# Patient Record
Sex: MALE | Race: WHITE | URBAN - METROPOLITAN AREA
[De-identification: names, ages, dates, MRNs, and addresses within clinical notes are randomized per-mention and may not be internally consistent; named-entity substitution may affect disease eponyms.]

---

## 2021-05-25 ENCOUNTER — RECORDS - HEALTHEAST (OUTPATIENT)
Dept: ADMINISTRATIVE | Facility: CLINIC | Age: 31
End: 2021-05-25

## 2021-05-26 ENCOUNTER — RECORDS - HEALTHEAST (OUTPATIENT)
Dept: ADMINISTRATIVE | Facility: CLINIC | Age: 31
End: 2021-05-26

## 2021-05-31 ENCOUNTER — RECORDS - HEALTHEAST (OUTPATIENT)
Dept: ADMINISTRATIVE | Facility: CLINIC | Age: 31
End: 2021-05-31

## 2022-09-28 ENCOUNTER — HOSPITAL ENCOUNTER (EMERGENCY)
Age: 32
Discharge: ANOTHER HOSPITAL | End: 2022-09-28
Attending: EMERGENCY MEDICINE
Payer: COMMERCIAL

## 2022-09-28 ENCOUNTER — APPOINTMENT (OUTPATIENT)
Dept: GENERAL RADIOLOGY | Age: 32
DRG: 638 | End: 2022-09-28
Attending: FAMILY MEDICINE
Payer: COMMERCIAL

## 2022-09-28 ENCOUNTER — APPOINTMENT (OUTPATIENT)
Dept: ULTRASOUND IMAGING | Age: 32
DRG: 638 | End: 2022-09-28
Attending: FAMILY MEDICINE
Payer: COMMERCIAL

## 2022-09-28 ENCOUNTER — HOSPITAL ENCOUNTER (INPATIENT)
Age: 32
LOS: 1 days | Discharge: HOME | DRG: 638 | End: 2022-09-29
Attending: FAMILY MEDICINE | Admitting: FAMILY MEDICINE
Payer: COMMERCIAL

## 2022-09-28 VITALS
OXYGEN SATURATION: 95 % | HEART RATE: 103 BPM | DIASTOLIC BLOOD PRESSURE: 86 MMHG | SYSTOLIC BLOOD PRESSURE: 120 MMHG | WEIGHT: 170 LBS | BODY MASS INDEX: 24.34 KG/M2 | RESPIRATION RATE: 20 BRPM | TEMPERATURE: 97.5 F | HEIGHT: 70 IN

## 2022-09-28 DIAGNOSIS — E08.10 DIABETIC KETOACIDOSIS WITHOUT COMA ASSOCIATED WITH DIABETES MELLITUS DUE TO UNDERLYING CONDITION (HCC): Primary | ICD-10-CM

## 2022-09-28 DIAGNOSIS — R07.9 CHEST PAIN, UNSPECIFIED TYPE: Primary | ICD-10-CM

## 2022-09-28 PROBLEM — E11.69 HYPERLIPIDEMIA ASSOCIATED WITH TYPE 2 DIABETES MELLITUS (HCC): Status: ACTIVE | Noted: 2021-06-16

## 2022-09-28 PROBLEM — E10.65 TYPE 1 DIABETES MELLITUS WITH HYPERGLYCEMIA (HCC): Status: ACTIVE | Noted: 2021-06-16

## 2022-09-28 PROBLEM — M72.0 DUPUYTREN'S CONTRACTURE OF BOTH HANDS: Status: ACTIVE | Noted: 2021-06-11

## 2022-09-28 PROBLEM — E11.59 HYPERTENSION ASSOCIATED WITH DIABETES (HCC): Status: ACTIVE | Noted: 2021-06-16

## 2022-09-28 PROBLEM — R11.2 NAUSEA & VOMITING: Status: ACTIVE | Noted: 2022-09-28

## 2022-09-28 PROBLEM — I15.2 HYPERTENSION ASSOCIATED WITH DIABETES (HCC): Status: ACTIVE | Noted: 2021-06-16

## 2022-09-28 PROBLEM — E78.5 HYPERLIPIDEMIA ASSOCIATED WITH TYPE 2 DIABETES MELLITUS (HCC): Status: ACTIVE | Noted: 2021-06-16

## 2022-09-28 PROBLEM — N52.9 VASCULOGENIC ERECTILE DYSFUNCTION: Status: ACTIVE | Noted: 2021-08-16

## 2022-09-28 LAB
ALBUMIN SERPL-MCNC: 4.2 G/DL (ref 3.5–5)
ALBUMIN/GLOB SERPL: 1.4 {RATIO}
ALP SERPL-CCNC: 95 U/L (ref 45–117)
ALT SERPL-CCNC: 16 U/L (ref 13–61)
ANION GAP SERPL CALC-SCNC: 10 MMOL/L (ref 4–13)
ANION GAP SERPL CALC-SCNC: 14 MMOL/L (ref 4–13)
ANION GAP SERPL CALC-SCNC: 22 MMOL/L (ref 7–16)
ANION GAP SERPL CALC-SCNC: 4 MMOL/L (ref 4–13)
AST SERPL-CCNC: 13 U/L (ref 15–37)
BASOPHILS # BLD: 0 K/UL (ref 0–0.2)
BASOPHILS NFR BLD: 0 % (ref 0–2)
BILIRUB SERPL-MCNC: 0.9 MG/DL (ref 0.2–1.1)
BUN SERPL-MCNC: 12 MG/DL (ref 6–23)
BUN SERPL-MCNC: 14 MG/DL (ref 6–23)
BUN SERPL-MCNC: 14 MG/DL (ref 6–23)
BUN SERPL-MCNC: 15 MG/DL (ref 7–18)
CALCIUM SERPL-MCNC: 10.2 MG/DL (ref 8.3–10.4)
CALCIUM SERPL-MCNC: 8.5 MG/DL (ref 8.3–10.4)
CALCIUM SERPL-MCNC: 8.6 MG/DL (ref 8.3–10.4)
CALCIUM SERPL-MCNC: 9.2 MG/DL (ref 8.3–10.4)
CHLORIDE SERPL-SCNC: 102 MMOL/L (ref 101–110)
CHLORIDE SERPL-SCNC: 107 MMOL/L (ref 101–110)
CHLORIDE SERPL-SCNC: 87 MMOL/L (ref 98–107)
CHLORIDE SERPL-SCNC: 99 MMOL/L (ref 101–110)
CO2 SERPL-SCNC: 16 MMOL/L (ref 21–32)
CO2 SERPL-SCNC: 16 MMOL/L (ref 21–32)
CO2 SERPL-SCNC: 21 MMOL/L (ref 21–32)
CO2 SERPL-SCNC: 23 MMOL/L (ref 21–32)
CREAT SERPL-MCNC: 0.95 MG/DL (ref 0.8–1.5)
CREAT SERPL-MCNC: 0.97 MG/DL (ref 0.8–1.5)
CREAT SERPL-MCNC: 1.01 MG/DL (ref 0.8–1.5)
CREAT SERPL-MCNC: 1.1 MG/DL (ref 0.8–1.5)
DIFFERENTIAL METHOD BLD: ABNORMAL
EOSINOPHIL # BLD: 0 K/UL (ref 0–0.8)
EOSINOPHIL NFR BLD: 0 % (ref 0.5–7.8)
ERYTHROCYTE [DISTWIDTH] IN BLOOD BY AUTOMATED COUNT: 12.1 % (ref 11.9–14.6)
EST. AVERAGE GLUCOSE BLD GHB EST-MCNC: 192 MG/DL
GLOBULIN SER CALC-MCNC: 3.1 G/DL (ref 2.3–3.5)
GLUCOSE BLD STRIP.AUTO-MCNC: 119 MG/DL (ref 65–100)
GLUCOSE BLD STRIP.AUTO-MCNC: 120 MG/DL (ref 65–100)
GLUCOSE BLD STRIP.AUTO-MCNC: 133 MG/DL (ref 65–100)
GLUCOSE BLD STRIP.AUTO-MCNC: 149 MG/DL (ref 65–100)
GLUCOSE BLD STRIP.AUTO-MCNC: 160 MG/DL (ref 65–100)
GLUCOSE BLD STRIP.AUTO-MCNC: 160 MG/DL (ref 65–100)
GLUCOSE BLD STRIP.AUTO-MCNC: 193 MG/DL (ref 65–100)
GLUCOSE BLD STRIP.AUTO-MCNC: 212 MG/DL (ref 65–100)
GLUCOSE BLD STRIP.AUTO-MCNC: 242 MG/DL (ref 65–100)
GLUCOSE BLD STRIP.AUTO-MCNC: 272 MG/DL (ref 65–100)
GLUCOSE BLD STRIP.AUTO-MCNC: 292 MG/DL (ref 65–100)
GLUCOSE BLD STRIP.AUTO-MCNC: 299 MG/DL (ref 65–100)
GLUCOSE BLD STRIP.AUTO-MCNC: 310 MG/DL (ref 65–100)
GLUCOSE BLD STRIP.AUTO-MCNC: 354 MG/DL (ref 65–100)
GLUCOSE BLD-MCNC: 292 MG/DL
GLUCOSE BLD-MCNC: 354 MG/DL
GLUCOSE SERPL-MCNC: 150 MG/DL (ref 65–100)
GLUCOSE SERPL-MCNC: 179 MG/DL (ref 65–100)
GLUCOSE SERPL-MCNC: 325 MG/DL (ref 65–100)
GLUCOSE SERPL-MCNC: 331 MG/DL (ref 65–100)
HBA1C MFR BLD: 8.3 % (ref 4.8–5.6)
HCT VFR BLD AUTO: 41.1 % (ref 41.1–50.3)
HGB BLD-MCNC: 14.6 G/DL (ref 13.6–17.2)
IMM GRANULOCYTES # BLD AUTO: 0.2 K/UL (ref 0–0.5)
IMM GRANULOCYTES NFR BLD AUTO: 1 % (ref 0–5)
LIPASE SERPL-CCNC: 26 U/L (ref 13–60)
LYMPHOCYTES # BLD: 2.1 K/UL (ref 0.5–4.6)
LYMPHOCYTES NFR BLD: 9 % (ref 13–44)
MAGNESIUM SERPL-MCNC: 1.6 MG/DL (ref 1.2–2.6)
MAGNESIUM SERPL-MCNC: 1.6 MG/DL (ref 1.8–2.4)
MAGNESIUM SERPL-MCNC: 1.9 MG/DL (ref 1.8–2.4)
MCH RBC QN AUTO: 29.4 PG (ref 26.1–32.9)
MCHC RBC AUTO-ENTMCNC: 35.5 G/DL (ref 31.4–35)
MCV RBC AUTO: 82.9 FL (ref 79.6–97.8)
MONOCYTES # BLD: 1.3 K/UL (ref 0.1–1.3)
MONOCYTES NFR BLD: 6 % (ref 4–12)
NEUTS SEG # BLD: 19 K/UL (ref 1.7–8.2)
NEUTS SEG NFR BLD: 84 % (ref 43–78)
NRBC # BLD: 0 K/UL (ref 0–0.2)
PHOSPHATE SERPL-MCNC: 1.3 MG/DL (ref 2.5–4.5)
PHOSPHATE SERPL-MCNC: 1.5 MG/DL (ref 2.5–4.5)
PLATELET # BLD AUTO: 632 K/UL (ref 150–450)
PMV BLD AUTO: 9.1 FL (ref 9.4–12.3)
POTASSIUM SERPL-SCNC: 3.8 MMOL/L (ref 3.5–5.1)
POTASSIUM SERPL-SCNC: 4 MMOL/L (ref 3.5–5.1)
POTASSIUM SERPL-SCNC: 4.2 MMOL/L (ref 3.5–5.1)
POTASSIUM SERPL-SCNC: 4.2 MMOL/L (ref 3.5–5.1)
PROT SERPL-MCNC: 7.3 G/DL (ref 6.4–8.2)
RBC # BLD AUTO: 4.96 M/UL (ref 4.23–5.6)
SERVICE CMNT-IMP: ABNORMAL
SODIUM SERPL-SCNC: 125 MMOL/L (ref 138–145)
SODIUM SERPL-SCNC: 130 MMOL/L (ref 136–145)
SODIUM SERPL-SCNC: 132 MMOL/L (ref 136–145)
SODIUM SERPL-SCNC: 134 MMOL/L (ref 136–145)
TROPONIN I SERPL HS-MCNC: 4.6 PG/ML (ref 0–14)
TROPONIN I SERPL HS-MCNC: 4.7 PG/ML (ref 0–14)
WBC # BLD AUTO: 22.5 K/UL (ref 4.3–11.1)

## 2022-09-28 PROCEDURE — 6360000002 HC RX W HCPCS: Performed by: EMERGENCY MEDICINE

## 2022-09-28 PROCEDURE — 2500000003 HC RX 250 WO HCPCS: Performed by: FAMILY MEDICINE

## 2022-09-28 PROCEDURE — 2000000000 HC ICU R&B

## 2022-09-28 PROCEDURE — 2580000003 HC RX 258: Performed by: EMERGENCY MEDICINE

## 2022-09-28 PROCEDURE — 83735 ASSAY OF MAGNESIUM: CPT

## 2022-09-28 PROCEDURE — 71045 X-RAY EXAM CHEST 1 VIEW: CPT

## 2022-09-28 PROCEDURE — 6370000000 HC RX 637 (ALT 250 FOR IP): Performed by: HOSPITALIST

## 2022-09-28 PROCEDURE — 6360000002 HC RX W HCPCS: Performed by: FAMILY MEDICINE

## 2022-09-28 PROCEDURE — 76706 US ABDL AORTA SCREEN AAA: CPT

## 2022-09-28 PROCEDURE — 2700000000 HC OXYGEN THERAPY PER DAY

## 2022-09-28 PROCEDURE — 83690 ASSAY OF LIPASE: CPT

## 2022-09-28 PROCEDURE — 96365 THER/PROPH/DIAG IV INF INIT: CPT

## 2022-09-28 PROCEDURE — 36415 COLL VENOUS BLD VENIPUNCTURE: CPT

## 2022-09-28 PROCEDURE — 84100 ASSAY OF PHOSPHORUS: CPT

## 2022-09-28 PROCEDURE — 6370000000 HC RX 637 (ALT 250 FOR IP): Performed by: FAMILY MEDICINE

## 2022-09-28 PROCEDURE — 83036 HEMOGLOBIN GLYCOSYLATED A1C: CPT

## 2022-09-28 PROCEDURE — 2580000003 HC RX 258: Performed by: HOSPITALIST

## 2022-09-28 PROCEDURE — 80053 COMPREHEN METABOLIC PANEL: CPT

## 2022-09-28 PROCEDURE — 6370000000 HC RX 637 (ALT 250 FOR IP): Performed by: EMERGENCY MEDICINE

## 2022-09-28 PROCEDURE — 84484 ASSAY OF TROPONIN QUANT: CPT

## 2022-09-28 PROCEDURE — 82962 GLUCOSE BLOOD TEST: CPT

## 2022-09-28 PROCEDURE — 2580000003 HC RX 258: Performed by: FAMILY MEDICINE

## 2022-09-28 PROCEDURE — 99285 EMERGENCY DEPT VISIT HI MDM: CPT

## 2022-09-28 PROCEDURE — 85025 COMPLETE CBC W/AUTO DIFF WBC: CPT

## 2022-09-28 PROCEDURE — 96375 TX/PRO/DX INJ NEW DRUG ADDON: CPT

## 2022-09-28 PROCEDURE — 80048 BASIC METABOLIC PNL TOTAL CA: CPT

## 2022-09-28 RX ORDER — PROCHLORPERAZINE EDISYLATE 5 MG/ML
10 INJECTION INTRAMUSCULAR; INTRAVENOUS
Status: COMPLETED | OUTPATIENT
Start: 2022-09-28 | End: 2022-09-28

## 2022-09-28 RX ORDER — HYDROMORPHONE HYDROCHLORIDE 1 MG/ML
1 INJECTION, SOLUTION INTRAMUSCULAR; INTRAVENOUS; SUBCUTANEOUS EVERY 4 HOURS PRN
Status: DISCONTINUED | OUTPATIENT
Start: 2022-09-28 | End: 2022-09-29 | Stop reason: HOSPADM

## 2022-09-28 RX ORDER — MAGNESIUM SULFATE 1 G/100ML
1000 INJECTION INTRAVENOUS PRN
Status: DISCONTINUED | OUTPATIENT
Start: 2022-09-28 | End: 2022-09-29 | Stop reason: HOSPADM

## 2022-09-28 RX ORDER — INSULIN GLARGINE 100 [IU]/ML
INJECTION, SOLUTION SUBCUTANEOUS NIGHTLY
COMMUNITY

## 2022-09-28 RX ORDER — INSULIN LISPRO 100 [IU]/ML
0-4 INJECTION, SOLUTION INTRAVENOUS; SUBCUTANEOUS NIGHTLY
Status: DISCONTINUED | OUTPATIENT
Start: 2022-09-28 | End: 2022-09-29 | Stop reason: HOSPADM

## 2022-09-28 RX ORDER — VALSARTAN 40 MG/1
40 TABLET ORAL DAILY
COMMUNITY
Start: 2021-09-16

## 2022-09-28 RX ORDER — DEXTROSE MONOHYDRATE 100 MG/ML
INJECTION, SOLUTION INTRAVENOUS CONTINUOUS PRN
Status: DISCONTINUED | OUTPATIENT
Start: 2022-09-28 | End: 2022-09-29 | Stop reason: HOSPADM

## 2022-09-28 RX ORDER — DEXTROSE, SODIUM CHLORIDE, AND POTASSIUM CHLORIDE 5; .45; .15 G/100ML; G/100ML; G/100ML
INJECTION INTRAVENOUS CONTINUOUS PRN
Status: DISCONTINUED | OUTPATIENT
Start: 2022-09-28 | End: 2022-09-28 | Stop reason: RX

## 2022-09-28 RX ORDER — PROCHLORPERAZINE EDISYLATE 5 MG/ML
10 INJECTION INTRAMUSCULAR; INTRAVENOUS EVERY 6 HOURS PRN
Status: DISCONTINUED | OUTPATIENT
Start: 2022-09-28 | End: 2022-09-29 | Stop reason: HOSPADM

## 2022-09-28 RX ORDER — SODIUM CHLORIDE 9 MG/ML
INJECTION, SOLUTION INTRAVENOUS CONTINUOUS
Status: DISCONTINUED | OUTPATIENT
Start: 2022-09-28 | End: 2022-09-28

## 2022-09-28 RX ORDER — SODIUM CHLORIDE, SODIUM LACTATE, POTASSIUM CHLORIDE, AND CALCIUM CHLORIDE .6; .31; .03; .02 G/100ML; G/100ML; G/100ML; G/100ML
1000 INJECTION, SOLUTION INTRAVENOUS
Status: COMPLETED | OUTPATIENT
Start: 2022-09-28 | End: 2022-09-28

## 2022-09-28 RX ORDER — 0.9 % SODIUM CHLORIDE 0.9 %
15 INTRAVENOUS SOLUTION INTRAVENOUS ONCE
Status: COMPLETED | OUTPATIENT
Start: 2022-09-28 | End: 2022-09-28

## 2022-09-28 RX ORDER — DEXTROSE MONOHYDRATE, SODIUM CHLORIDE, SODIUM LACTATE, POTASSIUM CHLORIDE, CALCIUM CHLORIDE 5; 600; 310; 179; 20 G/100ML; MG/100ML; MG/100ML; MG/100ML; MG/100ML
INJECTION, SOLUTION INTRAVENOUS CONTINUOUS
Status: DISCONTINUED | OUTPATIENT
Start: 2022-09-28 | End: 2022-09-29 | Stop reason: HOSPADM

## 2022-09-28 RX ORDER — POLYETHYLENE GLYCOL 3350 17 G/17G
17 POWDER, FOR SOLUTION ORAL DAILY PRN
Status: DISCONTINUED | OUTPATIENT
Start: 2022-09-28 | End: 2022-09-29 | Stop reason: HOSPADM

## 2022-09-28 RX ORDER — ENOXAPARIN SODIUM 100 MG/ML
40 INJECTION SUBCUTANEOUS DAILY
Status: DISCONTINUED | OUTPATIENT
Start: 2022-09-28 | End: 2022-09-29 | Stop reason: HOSPADM

## 2022-09-28 RX ORDER — POTASSIUM CHLORIDE 7.45 MG/ML
10 INJECTION INTRAVENOUS PRN
Status: DISCONTINUED | OUTPATIENT
Start: 2022-09-28 | End: 2022-09-29 | Stop reason: HOSPADM

## 2022-09-28 RX ORDER — BACLOFEN 10 MG/1
10 TABLET ORAL 3 TIMES DAILY
Status: DISCONTINUED | OUTPATIENT
Start: 2022-09-28 | End: 2022-09-29 | Stop reason: HOSPADM

## 2022-09-28 RX ORDER — ONDANSETRON 4 MG/1
4 TABLET, FILM COATED ORAL 3 TIMES DAILY PRN
COMMUNITY
Start: 2022-09-27

## 2022-09-28 RX ORDER — INSULIN LISPRO 100 [IU]/ML
0-8 INJECTION, SOLUTION INTRAVENOUS; SUBCUTANEOUS
Status: DISCONTINUED | OUTPATIENT
Start: 2022-09-28 | End: 2022-09-29 | Stop reason: HOSPADM

## 2022-09-28 RX ORDER — GLUCAGON 3 MG/1
POWDER NASAL
COMMUNITY
Start: 2022-08-05

## 2022-09-28 RX ORDER — INSULIN GLARGINE 100 [IU]/ML
10 INJECTION, SOLUTION SUBCUTANEOUS NIGHTLY
Status: DISCONTINUED | OUTPATIENT
Start: 2022-09-28 | End: 2022-09-29 | Stop reason: HOSPADM

## 2022-09-28 RX ORDER — HYDROMORPHONE HYDROCHLORIDE 1 MG/ML
0.5 INJECTION, SOLUTION INTRAMUSCULAR; INTRAVENOUS; SUBCUTANEOUS EVERY 4 HOURS PRN
Status: DISCONTINUED | OUTPATIENT
Start: 2022-09-28 | End: 2022-09-29 | Stop reason: HOSPADM

## 2022-09-28 RX ORDER — ATORVASTATIN CALCIUM 40 MG/1
40 TABLET, FILM COATED ORAL NIGHTLY
COMMUNITY
Start: 2022-08-05

## 2022-09-28 RX ORDER — TADALAFIL 10 MG/1
10 TABLET ORAL PRN
COMMUNITY
Start: 2021-09-16

## 2022-09-28 RX ADMIN — POTASSIUM CHLORIDE 10 MEQ: 7.46 INJECTION, SOLUTION INTRAVENOUS at 21:16

## 2022-09-28 RX ADMIN — INSULIN HUMAN 7 UNITS/HR: 1 INJECTION, SOLUTION INTRAVENOUS at 09:45

## 2022-09-28 RX ADMIN — SODIUM CHLORIDE 1157 ML: 9 INJECTION, SOLUTION INTRAVENOUS at 11:27

## 2022-09-28 RX ADMIN — SODIUM CHLORIDE 3 UNITS/HR: 9 INJECTION, SOLUTION INTRAVENOUS at 21:21

## 2022-09-28 RX ADMIN — DEXTROSE MONOHYDRATE, SODIUM CHLORIDE, SODIUM LACTATE, POTASSIUM CHLORIDE, CALCIUM CHLORIDE: 5; 600; 310; 179; 20 INJECTION, SOLUTION INTRAVENOUS at 20:32

## 2022-09-28 RX ADMIN — DEXTROSE MONOHYDRATE, SODIUM CHLORIDE, SODIUM LACTATE, POTASSIUM CHLORIDE, CALCIUM CHLORIDE: 5; 600; 310; 179; 20 INJECTION, SOLUTION INTRAVENOUS at 13:13

## 2022-09-28 RX ADMIN — ENOXAPARIN SODIUM 40 MG: 100 INJECTION SUBCUTANEOUS at 12:06

## 2022-09-28 RX ADMIN — SODIUM CHLORIDE, POTASSIUM CHLORIDE, SODIUM LACTATE AND CALCIUM CHLORIDE 1000 ML: 600; 310; 30; 20 INJECTION, SOLUTION INTRAVENOUS at 08:41

## 2022-09-28 RX ADMIN — POTASSIUM CHLORIDE 10 MEQ: 7.46 INJECTION, SOLUTION INTRAVENOUS at 22:09

## 2022-09-28 RX ADMIN — PROCHLORPERAZINE EDISYLATE 10 MG: 5 INJECTION INTRAMUSCULAR; INTRAVENOUS at 14:01

## 2022-09-28 RX ADMIN — PROCHLORPERAZINE EDISYLATE 10 MG: 5 INJECTION INTRAMUSCULAR; INTRAVENOUS at 23:12

## 2022-09-28 RX ADMIN — BACLOFEN 10 MG: 10 TABLET ORAL at 16:00

## 2022-09-28 RX ADMIN — BACLOFEN 10 MG: 10 TABLET ORAL at 21:23

## 2022-09-28 RX ADMIN — POTASSIUM CHLORIDE 10 MEQ: 7.46 INJECTION, SOLUTION INTRAVENOUS at 23:05

## 2022-09-28 RX ADMIN — SODIUM PHOSPHATE, MONOBASIC, MONOHYDRATE 20 MMOL: 276; 142 INJECTION, SOLUTION INTRAVENOUS at 21:24

## 2022-09-28 RX ADMIN — INSULIN HUMAN 7 UNITS: 100 INJECTION, SOLUTION PARENTERAL at 09:45

## 2022-09-28 RX ADMIN — PROCHLORPERAZINE EDISYLATE 10 MG: 5 INJECTION INTRAMUSCULAR; INTRAVENOUS at 08:42

## 2022-09-28 ASSESSMENT — ENCOUNTER SYMPTOMS
COUGH: 0
ABDOMINAL DISTENTION: 0
ABDOMINAL PAIN: 1
NAUSEA: 1
CONSTIPATION: 0
SHORTNESS OF BREATH: 1
VOMITING: 1
DIARRHEA: 0

## 2022-09-28 ASSESSMENT — PAIN DESCRIPTION - DESCRIPTORS
DESCRIPTORS: ACHING;DISCOMFORT
DESCRIPTORS: ACHING;DISCOMFORT
DESCRIPTORS: ACHING
DESCRIPTORS: ACHING;DISCOMFORT

## 2022-09-28 ASSESSMENT — PAIN SCALES - GENERAL
PAINLEVEL_OUTOF10: 7
PAINLEVEL_OUTOF10: 3
PAINLEVEL_OUTOF10: 0
PAINLEVEL_OUTOF10: 9
PAINLEVEL_OUTOF10: 3
PAINLEVEL_OUTOF10: 9
PAINLEVEL_OUTOF10: 3

## 2022-09-28 ASSESSMENT — PAIN - FUNCTIONAL ASSESSMENT: PAIN_FUNCTIONAL_ASSESSMENT: 0-10

## 2022-09-28 ASSESSMENT — PAIN DESCRIPTION - LOCATION
LOCATION: ABDOMEN
LOCATION: CHEST
LOCATION: ABDOMEN
LOCATION: CHEST

## 2022-09-28 ASSESSMENT — PAIN DESCRIPTION - ORIENTATION
ORIENTATION: MID
ORIENTATION: MID
ORIENTATION: UPPER

## 2022-09-28 ASSESSMENT — PAIN DESCRIPTION - FREQUENCY
FREQUENCY: CONTINUOUS
FREQUENCY: CONTINUOUS

## 2022-09-28 ASSESSMENT — PAIN DESCRIPTION - PAIN TYPE
TYPE: ACUTE PAIN
TYPE: ACUTE PAIN

## 2022-09-28 NOTE — ED PROVIDER NOTES
Emergency Department Provider Note                   PCP:                Carissa Arreola MD               Age: 34 y.o. Sex: male       ICD-10-CM    1. Diabetic ketoacidosis without coma associated with diabetes mellitus due to underlying condition ([facility]  E08.10           DISPOSITION Decision To Admit 09/28/2022 09:30:10 AM        MDM  Number of Diagnoses or Management Options  Diabetic ketoacidosis without coma associated with diabetes mellitus due to underlying condition [facility])  Diagnosis management comments: Given patient's symptoms and clinical picture consistent with DKA, anticipate admission to the hospital.       Amount and/or Complexity of Data Reviewed  Clinical lab tests: ordered and reviewed  Review and summarize past medical records: yes  Discuss the patient with other providers: yes  Independent visualization of images, tracings, or specimens: yes    Risk of Complications, Morbidity, and/or Mortality  Presenting problems: high  Diagnostic procedures: moderate  Management options: moderate    Critical Care  Total time providing critical care: 30-74 minutes (===================================================================  This patient is critically ill and there is a high probability of of imminent or life threatening deterioration in the patient's condition without immediate management. The nature of the patient's clinical problem is: DKA    I have spent 45 minutes in direct patient care, documentation, review of labs/xrays/old records, discussion with Family, Staff, Colleague . The time involved in the performance of separately reportable procedures was not counted toward critical care time.      FLUID BOLUS FOR HYPOTENSION  INSULIN DRIP FOR DKA    Anthony Robles DO, DO; 9/28/2022 @9:31 AM  ===================================================================        )    Patient Progress  Patient progress: stable             Orders Placed This Encounter   Procedures    CBC with Auto Differential    Comprehensive Metabolic Panel    Magnesium    Lipase    POCT Glucose    POCT Glucose        Medications   lactated ringers bolus (1,000 mLs IntraVENous New Bag 9/28/22 0841)   insulin regular (MYXREDLIN) 100 units in sodium chloride 0.9 % 100 ml infusion (has no administration in time range)   insulin regular (HUMULIN R;NOVOLIN R) injection 7 Units (has no administration in time range)   prochlorperazine (COMPAZINE) injection 10 mg (10 mg IntraVENous Given 9/28/22 0842)       New Prescriptions    No medications on file        Carlotta Huggins is a 34 y.o. male who presents to the Emergency Department with chief complaint of    Chief Complaint   Patient presents with    Emesis    Nausea    Abdominal Pain      Patient presents to the emergency department with complaints of nausea vomiting generalized myalgias and elevated blood sugar. Patient has been insulin requiring diabetic for about 10 years now and reports 1 previous hospitalization for his diabetes. He states over the past week he has been unable to keep his blood sugar control despite taking increased doses of insulin. In the last 24 hours he has developed a myalgias, shortness of breath, and severe nausea and vomiting. He denies hematemesis. He denies diarrhea. He denies fever or chills. Blood sugars this morning was over 300. The history is provided by the patient. Review of Systems   Constitutional:  Negative for appetite change, chills, diaphoresis and fever. Respiratory:  Positive for shortness of breath. Negative for cough. Gastrointestinal:  Positive for abdominal pain, nausea and vomiting. Negative for abdominal distention, constipation and diarrhea. All other systems reviewed and are negative.     Past Medical History:   Diagnosis Date    Diabetes type 1, controlled ([address]         Past Surgical History:   Procedure Laterality Date    FINGER AMPUTATION Left     fifth finger    KNEE SURGERY Left         No family history on file. Social History     Socioeconomic History    Marital status:          Patient has no known allergies. Previous Medications    INSULIN ASPART (NOVOLOG IJ)    Inject as directed    INSULIN GLARGINE (LANTUS) 100 UNIT/ML INJECTION VIAL    Inject into the skin nightly        Vitals signs and nursing note reviewed. Patient Vitals for the past 4 hrs:   Temp Pulse Resp BP SpO2   09/28/22 0816 97.5 °F (36.4 °C) 96 22 97/82 100 %          Physical Exam  Vitals and nursing note reviewed. Constitutional:       General: He is not in acute distress. Appearance: He is normal weight. He is ill-appearing. He is not toxic-appearing or diaphoretic. HENT:      Head: Normocephalic and atraumatic. Mouth/Throat:      Mouth: Mucous membranes are dry. Pharynx: Oropharynx is clear. Eyes:      Extraocular Movements: Extraocular movements intact. Conjunctiva/sclera: Conjunctivae normal.      Pupils: Pupils are equal, round, and reactive to light. Cardiovascular:      Rate and Rhythm: Normal rate and regular rhythm. Pulses: Normal pulses. Heart sounds: Normal heart sounds. Pulmonary:      Breath sounds: Normal breath sounds. Comments: Patient demonstrating Kussmal type respirations. Abdominal:      General: There is no distension. Palpations: Abdomen is soft. Tenderness: There is no abdominal tenderness. Musculoskeletal:      Cervical back: Normal range of motion and neck supple. No rigidity or tenderness. Lymphadenopathy:      Cervical: No cervical adenopathy. Skin:     General: Skin is warm and dry. Capillary Refill: Capillary refill takes less than 2 seconds. Neurological:      General: No focal deficit present. Mental Status: He is alert and oriented to person, place, and time. Mental status is at baseline. Psychiatric:         Mood and Affect: Mood normal.         Behavior: Behavior normal.         Thought Content:  Thought content normal.        Procedures    Results for orders placed or performed during the hospital encounter of 09/28/22   CBC with Auto Differential   Result Value Ref Range    WBC 22.5 (H) 4.3 - 11.1 K/uL    RBC 4.96 4.23 - 5.60 M/uL    Hemoglobin 14.6 13.6 - 17.2 g/dL    Hematocrit 41.1 41.1 - 50.3 %    MCV 82.9 79.6 - 97.8 FL    MCH 29.4 26.1 - 32.9 PG    MCHC 35.5 (H) 31.4 - 35.0 g/dL    RDW 12.1 11.9 - 14.6 %    Platelets 816 (H) 868 - 450 K/uL    MPV 9.1 (L) 9.4 - 12.3 FL    nRBC 0.00 0.0 - 0.2 K/uL    Differential Type AUTOMATED      Seg Neutrophils 84 (H) 43 - 78 %    Lymphocytes 9 (L) 13 - 44 %    Monocytes 6 4.0 - 12.0 %    Eosinophils % 0 (L) 0.5 - 7.8 %    Basophils 0 0.0 - 2.0 %    Immature Granulocytes 1 0.0 - 5.0 %    Segs Absolute 19.0 (H) 1.7 - 8.2 K/UL    Absolute Lymph # 2.1 0.5 - 4.6 K/UL    Absolute Mono # 1.3 0.1 - 1.3 K/UL    Absolute Eos # 0.0 0.0 - 0.8 K/UL    Basophils Absolute 0.0 0.0 - 0.2 K/UL    Absolute Immature Granulocyte 0.2 0.0 - 0.5 K/UL   Comprehensive Metabolic Panel   Result Value Ref Range    Sodium 125 (L) 138 - 145 mmol/L    Potassium 4.2 3.5 - 5.1 mmol/L    Chloride 87 (L) 98 - 107 mmol/L    CO2 16 (L) 21 - 32 mmol/L    Anion Gap 22 (H) 7.0 - 16.0 mmol/L    Glucose 331 (H) 65 - 100 mg/dL    BUN 15 7.0 - 18.0 MG/DL    Creatinine 1.01 0.8 - 1.5 MG/DL    GFR African American >109 >60 ml/min/1.73m2    GFR Non- >60 >60 ml/min/1.73m2    Calcium 10.2 8.3 - 10.4 MG/DL    Total Bilirubin 0.9 0.2 - 1.1 MG/DL    ALT 16 13.0 - 61.0 U/L    AST 13 (L) 15 - 37 U/L    Alk Phosphatase 95 45.0 - 117.0 U/L    Total Protein 7.3 6.4 - 8.2 g/dL    Albumin 4.2 3.5 - 5.0 g/dL    Globulin 3.1 2.3 - 3.5 g/dL    Albumin/Globulin Ratio 1.4     Magnesium   Result Value Ref Range    Magnesium 1.6 1.2 - 2.6 mg/dL   Lipase   Result Value Ref Range    Lipase 26 13 - 60 U/L   POCT Glucose   Result Value Ref Range    Glucose 354 mg/dL   POCT Glucose   Result Value Ref Range    POC Glucose 354 (H) 65 - 100 mg/dL    Performed by: Adria         No orders to display                       Voice dictation software was used during the making of this note. This software is not perfect and grammatical and other typographical errors may be present. This note has not been completely proofread for errors.      Brenda Remy, DO  09/28/22 [address], DO  09/28/22 2030

## 2022-09-28 NOTE — ED NOTES
TRANSFER - OUT REPORT:    teto Fenton gave Verbal report given to jules hunter rn on Cassandra Resendiz  being transferred to THE [place] [address] for routine progression of patient care       Report consisted of patient's Situation, Background, Assessment and   Recommendations(SBAR). Information from the following report(s) ED Encounter Summary, ED SBAR, and MAR was reviewed with the receiving nurse. Lines:   Peripheral IV 09/28/22 Left Antecubital (Active)   Site Assessment Clean, dry & intact 09/28/22 0841   Line Status Blood return noted; Flushed;Specimen collected 09/28/22 0841   Dressing Status New dressing applied 09/28/22 0841   Dressing Type Transparent 09/28/22 0841        Opportunity for questions and clarification was provided.       Patient transported with:  Monitor and [facility]  09/28/22 1010

## 2022-09-28 NOTE — H&P
Hospitalist History and Physical   Admit Date:  2022 10:52 AM   Name:  Endy Pineda   Age:  34 y.o. Sex:  male  :  1990   MRN:  [id number]   Room:  [facility]/    Presenting Complaint: No chief complaint on file. Reason(s) for Admission: Diabetic ketoacidosis without coma associated with secondary diabetes mellitus ([facility] [E08.10]     History of Present Illness:   Endy Pineda is a 34 y.o. male with medical history of Type 1 Diabetes who presented with nausea, vomiting. 1-2 days prior to presentation to the [facility] ED he developed muscle soreness, fatigue and nausea with intermittent vomiting. He developed increasing difficulty in managing his blood sugar despite 10 years of management using sliding scale insulin. At the time of his presentation he was wound to be in DKA with anion gap. Hospitalist was consulted for evaluation and he was admitted to the ICU at [facility].    Review of Systems:  10 systems reviewed and negative except as noted in HPI. Assessment & Plan:   Diabetic ketoacidosis without coma associated with secondary diabetes mellitus   Secondary to likely viral GI infection  -glucostabilizer, NPO until gap closes x2  -insulin gtt titration  -glargine 10U when gap closed x2  -BMP q4h    Chest pain  Intermittently reported during admission  -US aorta  -serial troponin  -ECG    Nausea, vomitting  -procal to evaluate for bacterial infection  -supportive care, antiemetics  -baclofen for dry heaves     Patient is critically ill. Without intervention, there is a high probability of acute organ impairment or life-threatening deterioration in the patient's condition from: diabetic ketoacidosis  Critical care interventions: insulin gtt titration  Total critical care time spent: 53 minutes. Time is indicative of direct patient attendance at bedside and on the patient's floor nearby.   Includes time spent at bedside performing history and exam, performing chart review, discussing findings and treatment plan with patient and/or family, discussing patient with nursing staff, consultants and colleagues, and ordering/reviewing pertinent laboratory and radiographic evaluations. Time excludes procedures. CPT:  81940: First 30-74 minutes  58607: Each block of 30 min. beyond 76          Anticipated discharge needs:     Home with outpatient follow up    Diet: Diet NPO  VTE ppx: enoxaparin  Code status: Full Code    Hospital Problems:  Principal Problem:    Diabetic ketoacidosis without coma associated with secondary diabetes mellitus ([facility]  Active Problems:    Hyperlipidemia associated with type 2 diabetes mellitus ([facility]    Type 1 diabetes mellitus with hyperglycemia (HCC)    Chest pain    Nausea & vomiting  Resolved Problems:    * No resolved hospital problems. *       Past History:     Past Medical History:   Diagnosis Date    Diabetes type 1, controlled ([facility]        Past Surgical History:   Procedure Laterality Date    FINGER AMPUTATION Left     fifth finger    KNEE SURGERY Left         Social History     Tobacco Use    Smoking status: Never    Smokeless tobacco: Never   Substance Use Topics    Alcohol use: Not on file      Social History     Substance and Sexual Activity   Drug Use Not on file       Family History   Problem Relation Age of Onset    Cancer Brother     Diabetes Maternal Grandfather     Diabetes Other         Immunization History   Administered Date(s) Administered    COVID-19, MODERNA BLUE border, Primary or Immunocompromised, (age 12y+), IM, 100 mcg/0.5mL 08/06/2021, 09/09/2021     No Known Allergies  Prior to Admit Medications:  Current Outpatient Medications   Medication Instructions    atorvastatin (LIPITOR) 40 mg, Oral, Nightly    Glucagon (BAQSIMI ONE PACK) 3 MG/DOSE POWD 3 mg (equal to 1 spray) into one nostril as needed for severe hypoglycemia. May repeat dose in 15 minutes if patient remains unresponsive.     Insulin Aspart (NOVOLOG IJ) Injection    insulin glargine (LANTUS) 100 UNIT/ML injection vial SubCUTAneous, NIGHTLY    ondansetron (ZOFRAN) 4 mg, Oral, 3 TIMES DAILY PRN    tadalafil (CIALIS) 10 mg, Oral, PRN    valsartan (DIOVAN) 40 mg, Oral, DAILY         Objective:   Patient Vitals for the past 24 hrs:   Temp Pulse Resp BP SpO2   09/28/22 1953 -- (!) 106 15 -- 99 %   09/28/22 1534 98.3 °F (36.8 °C) (!) 116 15 136/85 --   09/28/22 1500 -- (!) 107 12 136/85 --   09/28/22 1400 -- (!) 124 14 (!) 142/76 --   09/28/22 1100 98.2 °F (36.8 °C) (!) 114 17 (!) 153/89 99 %       Oxygen Therapy  SpO2: 99 %  Pulse Oximetry Type: Continuous  Pulse Oximeter Device Mode: Continuous  O2 Device: Nasal cannula  O2 Flow Rate (L/min): 2 L/min    Estimated body mass index is 24.39 kg/m² as calculated from the following:    Height as of this encounter: 5' 10\" (1.778 m). Weight as of this encounter: 170 lb (77.1 kg). Intake/Output Summary (Last 24 hours) at 9/28/2022 2118  Last data filed at 9/28/2022 1912  Gross per 24 hour   Intake 1955.22 ml   Output 1000 ml   Net 955.22 ml         Blood pressure 136/85, pulse (!) 106, temperature 98.3 °F (36.8 °C), temperature source Temporal, resp. rate 15, height 5' 10\" (1.778 m), weight 170 lb (77.1 kg), SpO2 99 %. Physical Exam  Vitals and nursing note reviewed. Constitutional:       General: He is in acute distress (moderate). Appearance: He is not diaphoretic. Comments: Seen multiple times, initially dry heaving, later with chest pain   HENT:      Head: Normocephalic and atraumatic. Eyes:      Extraocular Movements: Extraocular movements intact. Cardiovascular:      Rate and Rhythm: Regular rhythm. Tachycardia present. Pulmonary:      Effort: Pulmonary effort is normal. No respiratory distress. Abdominal:      General: There is no distension. Musculoskeletal:         General: No deformity. Right lower leg: No edema. Left lower leg: No edema. Skin:     Coloration: Skin is not jaundiced or pale. Neurological:      General: No focal deficit present. Mental Status: He is alert and oriented to person, place, and time. Psychiatric:         Mood and Affect: Mood is anxious. Behavior: Behavior normal. Behavior is cooperative.          Cognition and Memory: Cognition normal.         I have personally reviewed labs and tests showing:  Recent Labs:  Recent Results (from the past 24 hour(s))   POCT Glucose    Collection Time: 09/28/22  8:29 AM   Result Value Ref Range    POC Glucose 354 (H) 65 - 100 mg/dL    Performed by: Adria    POCT Glucose    Collection Time: 09/28/22  8:30 AM   Result Value Ref Range    Glucose 354 mg/dL   CBC with Auto Differential    Collection Time: 09/28/22  8:46 AM   Result Value Ref Range    WBC 22.5 (H) 4.3 - 11.1 K/uL    RBC 4.96 4.23 - 5.60 M/uL    Hemoglobin 14.6 13.6 - 17.2 g/dL    Hematocrit 41.1 41.1 - 50.3 %    MCV 82.9 79.6 - 97.8 FL    MCH 29.4 26.1 - 32.9 PG    MCHC 35.5 (H) 31.4 - 35.0 g/dL    RDW 12.1 11.9 - 14.6 %    Platelets 748 (H) 167 - 450 K/uL    MPV 9.1 (L) 9.4 - 12.3 FL    nRBC 0.00 0.0 - 0.2 K/uL    Differential Type AUTOMATED      Seg Neutrophils 84 (H) 43 - 78 %    Lymphocytes 9 (L) 13 - 44 %    Monocytes 6 4.0 - 12.0 %    Eosinophils % 0 (L) 0.5 - 7.8 %    Basophils 0 0.0 - 2.0 %    Immature Granulocytes 1 0.0 - 5.0 %    Segs Absolute 19.0 (H) 1.7 - 8.2 K/UL    Absolute Lymph # 2.1 0.5 - 4.6 K/UL    Absolute Mono # 1.3 0.1 - 1.3 K/UL    Absolute Eos # 0.0 0.0 - 0.8 K/UL    Basophils Absolute 0.0 0.0 - 0.2 K/UL    Absolute Immature Granulocyte 0.2 0.0 - 0.5 K/UL   Comprehensive Metabolic Panel    Collection Time: 09/28/22  8:46 AM   Result Value Ref Range    Sodium 125 (L) 138 - 145 mmol/L    Potassium 4.2 3.5 - 5.1 mmol/L    Chloride 87 (L) 98 - 107 mmol/L    CO2 16 (L) 21 - 32 mmol/L    Anion Gap 22 (H) 7.0 - 16.0 mmol/L    Glucose 331 (H) 65 - 100 mg/dL    BUN 15 7.0 - 18.0 MG/DL    Creatinine 1.01 0.8 - 1.5 MG/DL    GFR African American >109 >60 ml/min/1.73m2    GFR Non- >60 >60 ml/min/1.73m2    Calcium 10.2 8.3 - 10.4 MG/DL    Total Bilirubin 0.9 0.2 - 1.1 MG/DL    ALT 16 13.0 - 61.0 U/L    AST 13 (L) 15 - 37 U/L    Alk Phosphatase 95 45.0 - 117.0 U/L    Total Protein 7.3 6.4 - 8.2 g/dL    Albumin 4.2 3.5 - 5.0 g/dL    Globulin 3.1 2.3 - 3.5 g/dL    Albumin/Globulin Ratio 1.4     Magnesium    Collection Time: 09/28/22  8:46 AM   Result Value Ref Range    Magnesium 1.6 1.2 - 2.6 mg/dL   Lipase    Collection Time: 09/28/22  8:46 AM   Result Value Ref Range    Lipase 26 13 - 60 U/L   POCT Glucose    Collection Time: 09/28/22  9:41 AM   Result Value Ref Range    POC Glucose 292 (H) 65 - 100 mg/dL    Performed by: Adria    POCT Glucose    Collection Time: 09/28/22  9:42 AM   Result Value Ref Range    Glucose 292 mg/dL   Basic Metabolic Panel    Collection Time: 09/28/22 11:26 AM   Result Value Ref Range    Sodium 130 (L) 136 - 145 mmol/L    Potassium 3.8 3.5 - 5.1 mmol/L    Chloride 99 (L) 101 - 110 mmol/L    CO2 21 21 - 32 mmol/L    Anion Gap 10 4 - 13 mmol/L    Glucose 179 (H) 65 - 100 mg/dL    BUN 14 6 - 23 MG/DL    Creatinine 0.95 0.8 - 1.5 MG/DL    GFR African American >60 >60 ml/min/1.73m2    GFR Non- >60 >60 ml/min/1.73m2    Calcium 9.2 8.3 - 10.4 MG/DL   Magnesium    Collection Time: 09/28/22 11:26 AM   Result Value Ref Range    Magnesium 1.6 (L) 1.8 - 2.4 mg/dL   Phosphorus    Collection Time: 09/28/22 11:26 AM   Result Value Ref Range    Phosphorus 1.5 (L) 2.5 - 4.5 MG/DL   Troponin    Collection Time: 09/28/22 11:26 AM   Result Value Ref Range    Troponin, High Sensitivity 4.7 0 - 14 pg/mL   Hemoglobin A1c    Collection Time: 09/28/22 11:26 AM   Result Value Ref Range    Hemoglobin A1C 8.3 (H) 4.8 - 5.6 %    eAG 192 mg/dL   POCT Glucose    Collection Time: 09/28/22 12:05 PM   Result Value Ref Range    POC Glucose 149 (H) 65 - 100 mg/dL    Performed by: Cecilia    POCT Glucose Collection Time: 09/28/22  1:09 PM   Result Value Ref Range    POC Glucose 193 (H) 65 - 100 mg/dL    Performed by: [facility]     POCT Glucose    Collection Time: 09/28/22  2:08 PM   Result Value Ref Range    POC Glucose 272 (H) 65 - 100 mg/dL    Performed by: [facility]     POCT Glucose    Collection Time: 09/28/22  3:11 PM   Result Value Ref Range    POC Glucose 310 (H) 65 - 100 mg/dL    Performed by: [facility]     POCT Glucose    Collection Time: 09/28/22  4:03 PM   Result Value Ref Range    POC Glucose 299 (H) 65 - 100 mg/dL    Performed by: [facility]     Basic Metabolic Panel    Collection Time: 09/28/22  4:11 PM   Result Value Ref Range    Sodium 132 (L) 136 - 145 mmol/L    Potassium 4.2 3.5 - 5.1 mmol/L    Chloride 102 101 - 110 mmol/L    CO2 16 (L) 21 - 32 mmol/L    Anion Gap 14 (H) 4 - 13 mmol/L    Glucose 325 (H) 65 - 100 mg/dL    BUN 14 6 - 23 MG/DL    Creatinine 1.10 0.8 - 1.5 MG/DL    GFR African American >60 >60 ml/min/1.73m2    GFR Non- >60 >60 ml/min/1.73m2    Calcium 8.6 8.3 - 10.4 MG/DL   POCT Glucose    Collection Time: 09/28/22  5:14 PM   Result Value Ref Range    POC Glucose 242 (H) 65 - 100 mg/dL    Performed by: [facility]     POCT Glucose    Collection Time: 09/28/22  6:15 PM   Result Value Ref Range    POC Glucose 212 (H) 65 - 100 mg/dL    Performed by: [facility]     Troponin    Collection Time: 09/28/22  6:44 PM   Result Value Ref Range    Troponin, High Sensitivity 4.6 0 - 14 pg/mL   POCT Glucose    Collection Time: 09/28/22  7:19 PM   Result Value Ref Range    POC Glucose 160 (H) 65 - 100 mg/dL    Performed by: Michelle    Basic Metabolic Panel    Collection Time: 09/28/22  7:59 PM   Result Value Ref Range    Sodium 134 (L) 136 - 145 mmol/L    Potassium 4.0 3.5 - 5.1 mmol/L    Chloride 107 101 - 110 mmol/L    CO2 23 21 - 32 mmol/L    Anion Gap 4 4 - 13 mmol/L    Glucose 150 (H) 65 - 100 mg/dL    BUN 12 6 - 23 MG/DL    Creatinine 0.97 0.8 - 1.5 MG/DL    GFR African American >60 >60 ml/min/1.73m2    GFR Non- >60 >60 ml/min/1.73m2    Calcium 8.5 8.3 - 10.4 MG/DL   Magnesium    Collection Time: 09/28/22  7:59 PM   Result Value Ref Range    Magnesium 1.9 1.8 - 2.4 mg/dL   Phosphorus    Collection Time: 09/28/22  7:59 PM   Result Value Ref Range    Phosphorus 1.3 (L) 2.5 - 4.5 MG/DL   POCT Glucose    Collection Time: 09/28/22  8:18 PM   Result Value Ref Range    POC Glucose 120 (H) 65 - 100 mg/dL    Performed by: Michelle HAINES have personally reviewed imaging studies showing:  No results found. Echocardiogram:  No results found for this or any previous visit.         Orders Placed This Encounter   Medications    DISCONTD: insulin regular (HUMULIN R;NOVOLIN R) 100 Units in sodium chloride 0.9 % 100 mL infusion    insulin regular (HUMULIN R;NOVOLIN R) injection 0-10 Units    dextrose bolus 10% 125 mL    potassium chloride 10 mEq/100 mL IVPB (Peripheral Line)    magnesium sulfate 1000 mg in dextrose 5% 100 mL IVPB    OR Linked Order Group     sodium phosphate 10 mmol in sodium chloride 0.9 % 250 mL IVPB     sodium phosphate 15 mmol in dextrose 5 % 250 mL IVPB     sodium phosphate 20 mmol in dextrose 5 % 500 mL IVPB    polyethylene glycol (GLYCOLAX) packet 17 g    enoxaparin (LOVENOX) injection 40 mg     Order Specific Question:   Indication of Use     Answer:   Prophylaxis-DVT/PE    FOLLOWED BY Linked Order Group     0.9 % sodium chloride bolus     0.9 % sodium chloride infusion    DISCONTD: dextrose 5 % and 0.45 % NaCl with KCl 20 mEq infusion    prochlorperazine (COMPAZINE) injection 10 mg    dextrose 5% in lactated ringers with KCl 20 mEq infusion    glucose chewable tablet 16 g    OR Linked Order Group     dextrose bolus 10% 125 mL     dextrose bolus 10% 250 mL    glucagon (rDNA) injection 1 mg    dextrose 10 % infusion    insulin glargine (LANTUS) injection vial 10 Units    insulin lispro (HUMALOG) injection vial 0-8 Units    insulin lispro (HUMALOG) injection vial 0-4 Units    baclofen (LIORESAL) tablet 10 mg    OR Linked Order Group     HYDROmorphone HCl PF (DILAUDID) injection 0.5 mg     HYDROmorphone HCl PF (DILAUDID) injection 1 mg    insulin regular (HUMULIN R;NOVOLIN R) 100 Units in sodium chloride 0.9 % 100 mL infusion         Signed:  Miguel Montoya MD

## 2022-09-28 NOTE — ED TRIAGE NOTES
Arrived ambulatory to the ER. C/o N/V since Monday. Unresponsive to Zofran. Hx DM! And states BGL has remained high despite insulin. Has a continuous Glucose monitor. C/o upper abd pain from  heaving. Also staes had some diarrhea. Denies fevers, sore throat, earache.

## 2022-09-28 NOTE — PROGRESS NOTES
Admitted via stretcher from [place] ed. Iv insulin infusing. Patient nausea, dry heaving and c/o of chest pain. Dr Jodi Leigh on unit. Informed of new admission and c/o chestpain  Order for ekg received. Oriented to room/call bell. Connected to cardiac monitor.

## 2022-09-28 NOTE — EC ADMISSION CRITERIA
Medical Student History & Physical   Admit Date:  2022 10:52 AM   Name:  Antionette Veloz   Age:  34 y.o. Sex:  male  :  1990   MRN:  [id number]     Presenting Complaint: No chief complaint on file. Reason(s) for Admission: Diabetic ketoacidosis without coma associated with secondary diabetes mellitus ([facility] [E08.10]     History of Present Illness:   Antionette Veloz is a 34 y.o. male with medical history of Type I diabetes and dupuytren's contracture who presented with diabetic ketoacidosis. The patient reports he started feeling sick to his stomach starting this past 2022. During what he and his primary care provider believe to be a stomach flu he has vomited over 25 times, dry-heaved hourly since Monday, has experienced chest pain, and slight diarrhea. Pt noted his glucose levels were staying in the 300s range in spite of taking 6-8 units of insulin, at one point glucose went back down to 101 but then rapidly shot back up to the 300s. Pt reports reduced appetite and troubled keeping food down. The Pt has been diabetic for 10 years and recounts 1 prior episode of diabetic ketoacidosis 8 years ago. 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  with children. Denies tobacco, drug, and alcohol use.       Review of Systems:  10 systems reviewed and negative except as noted in HPI.  +: Fatigue, chest pain, lack of sleep, dry heaving, vomiting, nausea, diarrhea, diaphoresis, flushed skin, tremor, cold intolerance, sore throat, reduced appetite  -: Cough, dyspnea, wheezing, headache, visual changes, vertigo, palpitations, edema, polyuria, fainting, numbness, tingling  Assessment & Plan:       Diabetic ketoacidosis without coma associated with diabetes mellitus due to underlying condition   Glucose level, increased anion gap metabolic acidosis  -IV fluid management of NS, lactated ringers, 5% dextrose, insulin to regulate glucose and electrolyte abnormalities    Gastritis   PMH, high anion gap  -IV fluid management of NS, lactated ringers for dehydration due to GI losses  -IM Prochloperazine for nausea and vomiting    Hyponatremia  Sodium 125  -IV NS and lactated ringers  -Control vomiting to limit GI losses, and encourage consumption of solids and liquids once controlled        Diet: Diet NPO    Code status: [unfilled]    Active Hospital Problems    Diagnosis Date Noted    Diabetic ketoacidosis without coma associated with secondary diabetes mellitus ([facility] [address] 09/28/2022     Priority: Medium       Objective:   Patient Vitals for the past 24 hrs:   Temp Pulse Resp BP SpO2   09/28/22 1534 98.3 °F (36.8 °C) (!) 116 15 136/85 --   09/28/22 1500 -- (!) 107 12 136/85 --   09/28/22 1400 -- (!) 124 14 (!) 142/76 --   09/28/22 1100 98.2 °F (36.8 °C) (!) 114 17 (!) 153/89 99 %          Estimated body mass index is 24.39 kg/m² as calculated from the following:    Height as of this encounter: 5' 10\" (1.778 m). Weight as of this encounter: 170 lb (77.1 kg). Intake/Output Summary (Last 24 hours) at 9/28/2022 1554  Last data filed at 9/28/2022 1534  Gross per 24 hour   Intake 1000 ml   Output 1000 ml   Net 0 ml         Physical Exam:  General:    Well nourished. Ill appearing. Increased level of distress from baseline, fatigued  Head:  Normocephalic, atraumatic  Eyes:  Sclerae appear normal.  Pupils equally round. ENT:  Nares appear normal, no drainage. Dry oral mucosa   Neck:  No restricted ROM. Trachea midline   CV:   Regular rhythm. tachycardic. No m/r/g. No JVD. No edema  Lungs:   CTAB. No wheezing, rhonchi, or rales. Labored Kussmal respirations  Abdomen: Bowel sounds present but hypoactive. Soft, nontender, nondistended. Extremities: Warm and dry. No cyanosis or clubbing. Skin:     No rashes. Normal coloration. Normal turgor. Neuro:  Cranial nerves II-XII grossly intact. Sensation intact  Psych:  Fatigued and somewhat distressed mood and affect. Alert and oriented x3    I have reviewed ordered lab tests and independently visualized imaging below:    X-ray of Chest:  Impression   No acute cardiopulmonary abnormality.        Last 24hr Labs:  Recent Results (from the past 24 hour(s))   POCT Glucose    Collection Time: 09/28/22  8:29 AM   Result Value Ref Range    POC Glucose 354 (H) 65 - 100 mg/dL    Performed by: Adria    POCT Glucose    Collection Time: 09/28/22  8:30 AM   Result Value Ref Range    Glucose 354 mg/dL   CBC with Auto Differential    Collection Time: 09/28/22  8:46 AM   Result Value Ref Range    WBC 22.5 (H) 4.3 - 11.1 K/uL    RBC 4.96 4.23 - 5.60 M/uL    Hemoglobin 14.6 13.6 - 17.2 g/dL    Hematocrit 41.1 41.1 - 50.3 %    MCV 82.9 79.6 - 97.8 FL    MCH 29.4 26.1 - 32.9 PG    MCHC 35.5 (H) 31.4 - 35.0 g/dL    RDW 12.1 11.9 - 14.6 %    Platelets 797 (H) 917 - 450 K/uL    MPV 9.1 (L) 9.4 - 12.3 FL    nRBC 0.00 0.0 - 0.2 K/uL    Differential Type AUTOMATED      Seg Neutrophils 84 (H) 43 - 78 %    Lymphocytes 9 (L) 13 - 44 %    Monocytes 6 4.0 - 12.0 %    Eosinophils % 0 (L) 0.5 - 7.8 %    Basophils 0 0.0 - 2.0 %    Immature Granulocytes 1 0.0 - 5.0 %    Segs Absolute 19.0 (H) 1.7 - 8.2 K/UL    Absolute Lymph # 2.1 0.5 - 4.6 K/UL    Absolute Mono # 1.3 0.1 - 1.3 K/UL    Absolute Eos # 0.0 0.0 - 0.8 K/UL    Basophils Absolute 0.0 0.0 - 0.2 K/UL    Absolute Immature Granulocyte 0.2 0.0 - 0.5 K/UL   Comprehensive Metabolic Panel    Collection Time: 09/28/22  8:46 AM   Result Value Ref Range    Sodium 125 (L) 138 - 145 mmol/L    Potassium 4.2 3.5 - 5.1 mmol/L    Chloride 87 (L) 98 - 107 mmol/L    CO2 16 (L) 21 - 32 mmol/L    Anion Gap 22 (H) 7.0 - 16.0 mmol/L    Glucose 331 (H) 65 - 100 mg/dL    BUN 15 7.0 - 18.0 MG/DL    Creatinine 1.01 0.8 - 1.5 MG/DL    GFR  >109 >60 ml/min/1.73m2    GFR Non- >60 >60 ml/min/1.73m2    Calcium 10.2 8.3 - 10.4 MG/DL    Total Bilirubin 0.9 0.2 - 1.1 MG/DL    ALT 16 13.0 - 61.0 U/L    AST 13 (L) 15 - 37 U/L    Alk Phosphatase 95 45.0 - 117.0 U/L    Total Protein 7.3 6.4 - 8.2 g/dL    Albumin 4.2 3.5 - 5.0 g/dL    Globulin 3.1 2.3 - 3.5 g/dL    Albumin/Globulin Ratio 1.4     Magnesium    Collection Time: 09/28/22  8:46 AM   Result Value Ref Range    Magnesium 1.6 1.2 - 2.6 mg/dL   Lipase    Collection Time: 09/28/22  8:46 AM   Result Value Ref Range    Lipase 26 13 - 60 U/L   POCT Glucose    Collection Time: 09/28/22  9:41 AM   Result Value Ref Range    POC Glucose 292 (H) 65 - 100 mg/dL    Performed by: Adria    POCT Glucose    Collection Time: 09/28/22  9:42 AM   Result Value Ref Range    Glucose 292 mg/dL   Basic Metabolic Panel    Collection Time: 09/28/22 11:26 AM   Result Value Ref Range    Sodium 130 (L) 136 - 145 mmol/L    Potassium 3.8 3.5 - 5.1 mmol/L    Chloride 99 (L) 101 - 110 mmol/L    CO2 21 21 - 32 mmol/L    Anion Gap 10 4 - 13 mmol/L    Glucose 179 (H) 65 - 100 mg/dL    BUN 14 6 - 23 MG/DL    Creatinine 0.95 0.8 - 1.5 MG/DL    GFR African American >60 >60 ml/min/1.73m2    GFR Non- >60 >60 ml/min/1.73m2    Calcium 9.2 8.3 - 10.4 MG/DL   Magnesium    Collection Time: 09/28/22 11:26 AM   Result Value Ref Range    Magnesium 1.6 (L) 1.8 - 2.4 mg/dL   Phosphorus    Collection Time: 09/28/22 11:26 AM   Result Value Ref Range    Phosphorus 1.5 (L) 2.5 - 4.5 MG/DL   Troponin    Collection Time: 09/28/22 11:26 AM   Result Value Ref Range    Troponin, High Sensitivity 4.7 0 - 14 pg/mL   Hemoglobin A1c    Collection Time: 09/28/22 11:26 AM   Result Value Ref Range    Hemoglobin A1C 8.3 (H) 4.8 - 5.6 %    eAG 192 mg/dL   POCT Glucose    Collection Time: 09/28/22 12:05 PM   Result Value Ref Range    POC Glucose 149 (H) 65 - 100 mg/dL    Performed by: Cecilia    POCT Glucose    Collection Time: 09/28/22  1:09 PM   Result Value Ref Range    POC Glucose 193 (H) 65 - 100 mg/dL    Performed by: [facility]     POCT Glucose    Collection Time: 09/28/22  2:08 PM   Result Value Ref Range    POC Glucose 272 (H) 65 - 100 mg/dL    Performed by: [facility]     POCT Glucose    Collection Time: 09/28/22  3:11 PM   Result Value Ref Range    POC Glucose 310 (H) 65 - 100 mg/dL    Performed by: Cecilia        [email or web address]    Other Studies:  [unfilled]      @[email or web address]@      Signed:  Calvin Quick    Part of this note may have been written by using a voice dictation software. The note has been proof read but may still contain some grammatical/other typographical errors.

## 2022-09-29 VITALS
WEIGHT: 189.7 LBS | TEMPERATURE: 98.6 F | HEIGHT: 70 IN | RESPIRATION RATE: 18 BRPM | DIASTOLIC BLOOD PRESSURE: 75 MMHG | SYSTOLIC BLOOD PRESSURE: 152 MMHG | BODY MASS INDEX: 27.16 KG/M2 | HEART RATE: 109 BPM | OXYGEN SATURATION: 99 %

## 2022-09-29 PROBLEM — R07.9 CHEST PAIN: Status: RESOLVED | Noted: 2022-09-28 | Resolved: 2022-09-29

## 2022-09-29 PROBLEM — R11.2 NAUSEA & VOMITING: Status: RESOLVED | Noted: 2022-09-28 | Resolved: 2022-09-29

## 2022-09-29 PROBLEM — E08.10 DIABETIC KETOACIDOSIS WITHOUT COMA ASSOCIATED WITH SECONDARY DIABETES MELLITUS (HCC): Status: RESOLVED | Noted: 2022-09-28 | Resolved: 2022-09-29

## 2022-09-29 LAB
ALBUMIN SERPL-MCNC: 2.8 G/DL (ref 3.5–5)
ANION GAP SERPL CALC-SCNC: 4 MMOL/L (ref 4–13)
ANION GAP SERPL CALC-SCNC: 7 MMOL/L (ref 4–13)
ANION GAP SERPL CALC-SCNC: 9 MMOL/L (ref 4–13)
BUN SERPL-MCNC: 8 MG/DL (ref 6–23)
BUN SERPL-MCNC: 9 MG/DL (ref 6–23)
BUN SERPL-MCNC: 9 MG/DL (ref 6–23)
CALCIUM SERPL-MCNC: 8.1 MG/DL (ref 8.3–10.4)
CALCIUM SERPL-MCNC: 8.1 MG/DL (ref 8.3–10.4)
CALCIUM SERPL-MCNC: 8.2 MG/DL (ref 8.3–10.4)
CHLORIDE SERPL-SCNC: 104 MMOL/L (ref 101–110)
CHLORIDE SERPL-SCNC: 104 MMOL/L (ref 101–110)
CHLORIDE SERPL-SCNC: 106 MMOL/L (ref 101–110)
CO2 SERPL-SCNC: 22 MMOL/L (ref 21–32)
CO2 SERPL-SCNC: 23 MMOL/L (ref 21–32)
CO2 SERPL-SCNC: 25 MMOL/L (ref 21–32)
CREAT SERPL-MCNC: 0.78 MG/DL (ref 0.8–1.5)
CREAT SERPL-MCNC: 0.84 MG/DL (ref 0.8–1.5)
CREAT SERPL-MCNC: 0.87 MG/DL (ref 0.8–1.5)
ERYTHROCYTE [DISTWIDTH] IN BLOOD BY AUTOMATED COUNT: 12.6 % (ref 11.9–14.6)
GLUCOSE BLD STRIP.AUTO-MCNC: 140 MG/DL (ref 65–100)
GLUCOSE BLD STRIP.AUTO-MCNC: 140 MG/DL (ref 65–100)
GLUCOSE BLD STRIP.AUTO-MCNC: 146 MG/DL (ref 65–100)
GLUCOSE BLD STRIP.AUTO-MCNC: 158 MG/DL (ref 65–100)
GLUCOSE BLD STRIP.AUTO-MCNC: 167 MG/DL (ref 65–100)
GLUCOSE BLD STRIP.AUTO-MCNC: 244 MG/DL (ref 65–100)
GLUCOSE SERPL-MCNC: 154 MG/DL (ref 65–100)
GLUCOSE SERPL-MCNC: 161 MG/DL (ref 65–100)
GLUCOSE SERPL-MCNC: 266 MG/DL (ref 65–100)
HCT VFR BLD AUTO: 36.6 % (ref 41.1–50.3)
HGB BLD-MCNC: 12.5 G/DL (ref 13.6–17.2)
MAGNESIUM SERPL-MCNC: 1.7 MG/DL (ref 1.8–2.4)
MAGNESIUM SERPL-MCNC: 2 MG/DL (ref 1.8–2.4)
MCH RBC QN AUTO: 28.9 PG (ref 26.1–32.9)
MCHC RBC AUTO-ENTMCNC: 34.2 G/DL (ref 31.4–35)
MCV RBC AUTO: 84.5 FL (ref 79.6–97.8)
NRBC # BLD: 0 K/UL (ref 0–0.2)
PHOSPHATE SERPL-MCNC: 2 MG/DL (ref 2.5–4.5)
PHOSPHATE SERPL-MCNC: 2.3 MG/DL (ref 2.5–4.5)
PHOSPHATE SERPL-MCNC: 2.5 MG/DL (ref 2.5–4.5)
PLATELET # BLD AUTO: 544 K/UL (ref 150–450)
PMV BLD AUTO: 9 FL (ref 9.4–12.3)
POTASSIUM SERPL-SCNC: 3.8 MMOL/L (ref 3.5–5.1)
POTASSIUM SERPL-SCNC: 4.4 MMOL/L (ref 3.5–5.1)
POTASSIUM SERPL-SCNC: 4.7 MMOL/L (ref 3.5–5.1)
PROCALCITONIN SERPL-MCNC: 0.23 NG/ML (ref 0–0.49)
RBC # BLD AUTO: 4.33 M/UL (ref 4.23–5.6)
SERVICE CMNT-IMP: ABNORMAL
SODIUM SERPL-SCNC: 133 MMOL/L (ref 136–145)
SODIUM SERPL-SCNC: 135 MMOL/L (ref 136–145)
SODIUM SERPL-SCNC: 136 MMOL/L (ref 136–145)
TSH W FREE THYROID IF ABNORMAL: 1.18 UIU/ML (ref 0.36–3.74)
WBC # BLD AUTO: 17.1 K/UL (ref 4.3–11.1)

## 2022-09-29 PROCEDURE — 6370000000 HC RX 637 (ALT 250 FOR IP): Performed by: FAMILY MEDICINE

## 2022-09-29 PROCEDURE — 82962 GLUCOSE BLOOD TEST: CPT

## 2022-09-29 PROCEDURE — 82040 ASSAY OF SERUM ALBUMIN: CPT

## 2022-09-29 PROCEDURE — 83735 ASSAY OF MAGNESIUM: CPT

## 2022-09-29 PROCEDURE — 80048 BASIC METABOLIC PNL TOTAL CA: CPT

## 2022-09-29 PROCEDURE — 84100 ASSAY OF PHOSPHORUS: CPT

## 2022-09-29 PROCEDURE — 6360000002 HC RX W HCPCS: Performed by: FAMILY MEDICINE

## 2022-09-29 PROCEDURE — 84443 ASSAY THYROID STIM HORMONE: CPT

## 2022-09-29 PROCEDURE — 36415 COLL VENOUS BLD VENIPUNCTURE: CPT

## 2022-09-29 PROCEDURE — 2580000003 HC RX 258: Performed by: HOSPITALIST

## 2022-09-29 PROCEDURE — 85027 COMPLETE CBC AUTOMATED: CPT

## 2022-09-29 PROCEDURE — 84145 PROCALCITONIN (PCT): CPT

## 2022-09-29 RX ORDER — SODIUM CHLORIDE 9 MG/ML
INJECTION, SOLUTION INTRAVENOUS CONTINUOUS
Status: DISCONTINUED | OUTPATIENT
Start: 2022-09-29 | End: 2022-09-29 | Stop reason: HOSPADM

## 2022-09-29 RX ADMIN — INSULIN LISPRO 2 UNITS: 100 INJECTION, SOLUTION INTRAVENOUS; SUBCUTANEOUS at 08:54

## 2022-09-29 RX ADMIN — SODIUM CHLORIDE: 9 INJECTION, SOLUTION INTRAVENOUS at 09:19

## 2022-09-29 RX ADMIN — SODIUM CHLORIDE: 9 INJECTION, SOLUTION INTRAVENOUS at 03:04

## 2022-09-29 RX ADMIN — POTASSIUM CHLORIDE 10 MEQ: 7.46 INJECTION, SOLUTION INTRAVENOUS at 10:31

## 2022-09-29 RX ADMIN — PROCHLORPERAZINE EDISYLATE 10 MG: 5 INJECTION INTRAMUSCULAR; INTRAVENOUS at 05:36

## 2022-09-29 RX ADMIN — ENOXAPARIN SODIUM 40 MG: 100 INJECTION SUBCUTANEOUS at 08:36

## 2022-09-29 RX ADMIN — INSULIN GLARGINE 10 UNITS: 100 INJECTION, SOLUTION SUBCUTANEOUS at 01:17

## 2022-09-29 RX ADMIN — BACLOFEN 10 MG: 10 TABLET ORAL at 08:35

## 2022-09-29 ASSESSMENT — PAIN SCALES - GENERAL
PAINLEVEL_OUTOF10: 2
PAINLEVEL_OUTOF10: 0

## 2022-09-29 NOTE — PROGRESS NOTES
Discharge instructions given to patient . No changes made in medications. Patient to schedule follow up appointment with PCP.  Will d/c when ride arrives

## 2022-09-29 NOTE — CARE COORDINATION
09/29/22 0905   Service Assessment   Patient Orientation Alert and Oriented   Cognition Alert   History Provided By Patient   Primary Caregiver Self   Support Systems Spouse/Significant Other;Family Members   PCP Verified by CM Yes   Prior Functional Level Independent in ADLs/IADLs   Current Functional Level Independent in ADLs/IADLs   Can patient return to prior living arrangement Yes   Ability to make needs known: Good   Social/Functional History   Lives With Spouse   Type of Home House   ADL Assistance Independent   Mode of Transportation Car   Discharge Planning   Type of Residence House   Living Arrangements Children;Spouse/Significant Other   Current Services Prior To Admission None   Potential Assistance Needed N/A   Type of [address] None   [facility]   Services At/After Discharge None     RN CM met with the patient at the bedside and discussed dicharge planning. He lives in a private residence with his spouse and children and plans on returning home at discharge. He confirmed he has his diabetic supplies and does not use any other DMEs. He does not use external services. He has a PCP. Case Management will continue to follow him for discharge planning services.

## 2022-09-29 NOTE — PROGRESS NOTES
Blood Glucose within range > 4hrs. Anion gap closed x 2 checks. Co2 now normalized. MD notified for transition off gtt. 10u Lantus subcu administered at connie. 0115, see MAR. Fluids changed to normal saline at 150mL/hr at time of discontinuing insulin gtt. Insulin gtt stopped at approximately 0300, see MAR.

## 2022-09-29 NOTE — CARE COORDINATION
Discharge planning was discussed with the Critical Care Interdisciplinary Team. He is a potential transfer to a Medical Surgical bed today.

## 2022-09-29 NOTE — DISCHARGE SUMMARY
Hospitalist Discharge Summary   Admit Date:  2022 10:52 AM   DC Note date: 2022  Name:  Kody Adair   Age:  34 y.o. Sex:  male  :  1990   MRN:  [id number]   Room:  [facility]  PCP:  Noé Reza MD    Presenting Complaint: No chief complaint on file. Initial Admission Diagnosis: Diabetic ketoacidosis without coma associated with secondary diabetes mellitus ([address] [E08.10]     Problem List for this Hospitalization (present on admission):    Principal Problem (Resolved):    Diabetic ketoacidosis without coma associated with secondary diabetes mellitus ([address]  Active Problems:    Hyperlipidemia associated with type 2 diabetes mellitus ([address]    Type 1 diabetes mellitus with hyperglycemia ([address]  Resolved Problems:    Chest pain    Nausea & vomiting      Hospital Course:  32M PMHx Type 1 Diabetes who presented with nausea, vomiting. 1-2 days prior to presentation to the [facility] ED he developed muscle soreness, fatigue and nausea with intermittent vomiting. He developed increasing difficulty in managing his blood sugar despite 10 years of management using sliding scale insulin. At the time of his presentation he was found to be in DKA with anion gap. Hospitalist was consulted for evaluation and he was admitted to the ICU at [facility]. He was placed on an insulin gtt with glucostabilizer and rehydrated. His electrolyte abnormalities were corrected. When his gap was closed x2, he was placed on basal insulin. His diet was restarted. He was able to tolerate foods without pain. He was determined to be appropriate for discharge . Of note he reported chest pain while in the ICU. Cardiac work up was negative. Disposition: home with outpatient follow up  Diet: ADULT DIET; Full Liquid; 5 carb choices (75 gm/meal)  Code Status: Full Code    Follow Ups:   Follow-up Information     Noé Reza MD. Schedule an appointment as soon as possible for a   visit in 1 week(s).     Specialty: Family Medicine  Why: Transition of Care Management  Contact information:  [address] [place] [address]  [phone]                       Time spent in patient discharge and coordination 37 minutes. Follow up labs/diagnostics (ultimately defer to outpatient provider):  Return precautions  Blood sugar control  Follow up cultures    Plan was discussed with patient, nurse, . All questions answered. Patient was stable at time of discharge. Instructions given to call a physician or return if any concerns. Current Discharge Medication List        CONTINUE these medications which have NOT CHANGED    Details   atorvastatin (LIPITOR) 40 MG tablet Take 40 mg by mouth at bedtime      valsartan (DIOVAN) 40 MG tablet Take 40 mg by mouth daily      tadalafil (CIALIS) 10 MG tablet Take 10 mg by mouth as needed      ondansetron (ZOFRAN) 4 MG tablet Take 4 mg by mouth 3 times daily as needed      Glucagon (BAQSIMI ONE PACK) 3 MG/DOSE POWD 3 mg (equal to 1 spray) into one nostril as needed for severe hypoglycemia. May repeat dose in 15 minutes if patient remains unresponsive. Insulin Aspart (NOVOLOG IJ) Inject as directed      insulin glargine (LANTUS) 100 UNIT/ML injection vial Inject into the skin nightly             Procedures done this admission:  * No surgery found *    Consults this admission:  IP CONSULT TO DIABETES MANAGEMENT    Echocardiogram results:  No results found for this or any previous visit. Diagnostic Imaging/Tests:   XR CHEST PORTABLE    Result Date: 9/28/2022  No acute cardiopulmonary abnormality.      Vascular AAA screening    Result Date: 9/29/2022  No evidence of abdominal aortic aneurysm       Labs: Results:       BMP, Mg, Phos Recent Labs     09/28/22  1959 09/29/22  0017 09/29/22  0332 09/29/22  0746   * 135* 136 133*   K 4.0 4.4 3.8 4.7    106 104 104   CO2 23 25 23 22   ANIONGAP 4 4 9 7   BUN 12 9 8 9 CREATININE 0.97 0.87 0.84 0.78*   LABGLOM >60 >60 >60 >60   GFRAA >60 >60 >60 >60   CALCIUM 8.5 8.1* 8.2* 8.1*   GLUCOSE 150* 161* 154* 266*   MG 1.9 2.0  --  1.7*   PHOS 1.3* 2.3* 2.5 2.0*      CBC Recent Labs     09/28/22  0846 09/29/22 0332   WBC 22.5* 17.1*   RBC 4.96 4.33   HGB 14.6 12.5*   HCT 41.1 36.6*   MCV 82.9 84.5   MCH 29.4 28.9   MCHC 35.5* 34.2   RDW 12.1 12.6   * 544*   MPV 9.1* 9.0*   NRBC 0.00 0.00   SEGS 84*  --    LYMPHOPCT 9*  --    EOSRELPCT 0*  --    MONOPCT 6  --    BASOPCT 0  --    IMMGRAN 1  --    SEGSABS 19.0*  --    LYMPHSABS 2.1  --    EOSABS 0.0  --    MONOSABS 1.3  --    BASOSABS 0.0  --    ABSIMMGRAN 0.2  --       LFT Recent Labs     09/28/22  0846 09/29/22 0332   BILITOT 0.9  --    ALKPHOS 95  --    AST 13*  --    ALT 16  --    PROT 7.3  --    LABALBU 4.2 2.8*   GLOB 3.1  --       Cardiac  Lab Results   Component Value Date/Time    TROPHS 4.6 09/28/2022 06:44 PM    TROPHS 4.7 09/28/2022 11:26 AM      Coags No results found for: PROTIME, INR, APTT   A1c Lab Results   Component Value Date/Time    LABA1C 8.3 09/28/2022 11:26 AM     09/28/2022 11:26 AM      Lipids No results found for: CHOL, LDLCALC, LABVLDL, HDL, CHOLHDLRATIO, TRIG   Thyroid  Lab Results   Component Value Date/Time    TSHELE 1.18 09/29/2022 03:32 AM        Most Recent UA No results found for: COLORU, APPEARANCE, SPECGRAV, LABPH, PROTEINU, GLUCOSEU, KETUA, BILIRUBINUR, BLOODU, UROBILINOGEN, NITRU, LEUKOCYTESUR, WBCUA, RBCUA, EPITHUA, BACTERIA, LABCAST, MUCUS     No results for input(s): CULTURE in the last 720 hours.     All Labs from Last 24 Hrs:  Recent Results (from the past 24 hour(s))   Basic Metabolic Panel    Collection Time: 09/28/22 11:26 AM   Result Value Ref Range    Sodium 130 (L) 136 - 145 mmol/L    Potassium 3.8 3.5 - 5.1 mmol/L    Chloride 99 (L) 101 - 110 mmol/L    CO2 21 21 - 32 mmol/L    Anion Gap 10 4 - 13 mmol/L    Glucose 179 (H) 65 - 100 mg/dL    BUN 14 6 - 23 MG/DL    Creatinine 0. 95 0.8 - 1.5 MG/DL    GFR African American >60 >60 ml/min/1.73m2    GFR Non- >60 >60 ml/min/1.73m2    Calcium 9.2 8.3 - 10.4 MG/DL   Magnesium    Collection Time: 09/28/22 11:26 AM   Result Value Ref Range    Magnesium 1.6 (L) 1.8 - 2.4 mg/dL   Phosphorus    Collection Time: 09/28/22 11:26 AM   Result Value Ref Range    Phosphorus 1.5 (L) 2.5 - 4.5 MG/DL   Troponin    Collection Time: 09/28/22 11:26 AM   Result Value Ref Range    Troponin, High Sensitivity 4.7 0 - 14 pg/mL   Hemoglobin A1c    Collection Time: 09/28/22 11:26 AM   Result Value Ref Range    Hemoglobin A1C 8.3 (H) 4.8 - 5.6 %    eAG 192 mg/dL   POCT Glucose    Collection Time: 09/28/22 12:05 PM   Result Value Ref Range    POC Glucose 149 (H) 65 - 100 mg/dL    Performed by: [facility]     POCT Glucose    Collection Time: 09/28/22  1:09 PM   Result Value Ref Range    POC Glucose 193 (H) 65 - 100 mg/dL    Performed by: [facility]     POCT Glucose    Collection Time: 09/28/22  2:08 PM   Result Value Ref Range    POC Glucose 272 (H) 65 - 100 mg/dL    Performed by: [facility]     POCT Glucose    Collection Time: 09/28/22  3:11 PM   Result Value Ref Range    POC Glucose 310 (H) 65 - 100 mg/dL    Performed by: [facility]     POCT Glucose    Collection Time: 09/28/22  4:03 PM   Result Value Ref Range    POC Glucose 299 (H) 65 - 100 mg/dL    Performed by: [facility]     Basic Metabolic Panel    Collection Time: 09/28/22  4:11 PM   Result Value Ref Range    Sodium 132 (L) 136 - 145 mmol/L    Potassium 4.2 3.5 - 5.1 mmol/L    Chloride 102 101 - 110 mmol/L    CO2 16 (L) 21 - 32 mmol/L    Anion Gap 14 (H) 4 - 13 mmol/L    Glucose 325 (H) 65 - 100 mg/dL    BUN 14 6 - 23 MG/DL    Creatinine 1.10 0.8 - 1.5 MG/DL    GFR African American >60 >60 ml/min/1.73m2    GFR Non- >60 >60 ml/min/1.73m2    Calcium 8.6 8.3 - 10.4 MG/DL   POCT Glucose    Collection Time: 09/28/22  5:14 PM   Result Value Ref Range    POC Glucose 242 (H) 65 - 100 mg/dL Performed by: [facility]     POCT Glucose    Collection Time: 09/28/22  6:15 PM   Result Value Ref Range    POC Glucose 212 (H) 65 - 100 mg/dL    Performed by: [facility]     Troponin    Collection Time: 09/28/22  6:44 PM   Result Value Ref Range    Troponin, High Sensitivity 4.6 0 - 14 pg/mL   POCT Glucose    Collection Time: 09/28/22  7:19 PM   Result Value Ref Range    POC Glucose 160 (H) 65 - 100 mg/dL    Performed by: [facility]    Basic Metabolic Panel    Collection Time: 09/28/22  7:59 PM   Result Value Ref Range    Sodium 134 (L) 136 - 145 mmol/L    Potassium 4.0 3.5 - 5.1 mmol/L    Chloride 107 101 - 110 mmol/L    CO2 23 21 - 32 mmol/L    Anion Gap 4 4 - 13 mmol/L    Glucose 150 (H) 65 - 100 mg/dL    BUN 12 6 - 23 MG/DL    Creatinine 0.97 0.8 - 1.5 MG/DL    GFR African American >60 >60 ml/min/1.73m2    GFR Non- >60 >60 ml/min/1.73m2    Calcium 8.5 8.3 - 10.4 MG/DL   Magnesium    Collection Time: 09/28/22  7:59 PM   Result Value Ref Range    Magnesium 1.9 1.8 - 2.4 mg/dL   Phosphorus    Collection Time: 09/28/22  7:59 PM   Result Value Ref Range    Phosphorus 1.3 (L) 2.5 - 4.5 MG/DL   POCT Glucose    Collection Time: 09/28/22  8:18 PM   Result Value Ref Range    POC Glucose 120 (H) 65 - 100 mg/dL    Performed by: [facility]    POCT Glucose    Collection Time: 09/28/22  9:20 PM   Result Value Ref Range    POC Glucose 119 (H) 65 - 100 mg/dL    Performed by: [facility]    POCT Glucose    Collection Time: 09/28/22 10:15 PM   Result Value Ref Range    POC Glucose 133 (H) 65 - 100 mg/dL    Performed by: [facility]    POCT Glucose    Collection Time: 09/28/22 11:15 PM   Result Value Ref Range    POC Glucose 160 (H) 65 - 100 mg/dL    Performed by: [facility]    POCT Glucose    Collection Time: 09/29/22 12:16 AM   Result Value Ref Range    POC Glucose 140 (H) 65 - 100 mg/dL    Performed by: [facility]    Basic Metabolic Panel    Collection Time: 09/29/22 12:17 AM   Result Value Ref Range    Sodium 135 (L) 136 - 145 mmol/L    Potassium 4.4 3.5 - 5.1 mmol/L    Chloride 106 101 - 110 mmol/L    CO2 25 21 - 32 mmol/L    Anion Gap 4 4 - 13 mmol/L    Glucose 161 (H) 65 - 100 mg/dL    BUN 9 6 - 23 MG/DL    Creatinine 0.87 0.8 - 1.5 MG/DL    GFR African American >60 >60 ml/min/1.73m2    GFR Non- >60 >60 ml/min/1.73m2    Calcium 8.1 (L) 8.3 - 10.4 MG/DL   Magnesium    Collection Time: 09/29/22 12:17 AM   Result Value Ref Range    Magnesium 2.0 1.8 - 2.4 mg/dL   Phosphorus    Collection Time: 09/29/22 12:17 AM   Result Value Ref Range    Phosphorus 2.3 (L) 2.5 - 4.5 MG/DL   POCT Glucose    Collection Time: 09/29/22  1:15 AM   Result Value Ref Range    POC Glucose 146 (H) 65 - 100 mg/dL    Performed by: [facility]    POCT Glucose    Collection Time: 09/29/22  2:16 AM   Result Value Ref Range    POC Glucose 158 (H) 65 - 100 mg/dL    Performed by: [facility]    POCT Glucose    Collection Time: 09/29/22  3:05 AM   Result Value Ref Range    POC Glucose 140 (H) 65 - 100 mg/dL    Performed by: [facility]    Phosphorus    Collection Time: 09/29/22  3:32 AM   Result Value Ref Range    Phosphorus 2.5 2.5 - 4.5 MG/DL   Albumin    Collection Time: 09/29/22  3:32 AM   Result Value Ref Range    Albumin 2.8 (L) 3.5 - 5.0 g/dL   Basic Metabolic Panel w/ Reflex to MG    Collection Time: 09/29/22  3:32 AM   Result Value Ref Range    Sodium 136 136 - 145 mmol/L    Potassium 3.8 3.5 - 5.1 mmol/L    Chloride 104 101 - 110 mmol/L    CO2 23 21 - 32 mmol/L    Anion Gap 9 4 - 13 mmol/L    Glucose 154 (H) 65 - 100 mg/dL    BUN 8 6 - 23 MG/DL    Creatinine 0.84 0.8 - 1.5 MG/DL    GFR African American >60 >60 ml/min/1.73m2    GFR Non- >60 >60 ml/min/1.73m2    Calcium 8.2 (L) 8.3 - 10.4 MG/DL   CBC    Collection Time: 09/29/22  3:32 AM   Result Value Ref Range    WBC 17.1 (H) 4.3 - 11.1 K/uL    RBC 4.33 4.23 - 5.6 M/uL    Hemoglobin 12.5 (L) 13.6 - 17.2 g/dL    Hematocrit 36.6 (L) 41.1 - 50.3 %    MCV 84.5 79.6 - 97.8 FL    MCH 28.9 26.1 - 32.9 PG    MCHC 34.2 31.4 - 35.0 g/dL    RDW 12.6 11.9 - 14.6 %    Platelets 424 (H) 316 - 450 K/uL    MPV 9.0 (L) 9.4 - 12.3 FL    nRBC 0.00 0.0 - 0.2 K/uL   TSH with Reflex    Collection Time: 09/29/22  3:32 AM   Result Value Ref Range    TSH w Free Thyroid if Abnormal 1.18 0.358 - 3.740 UIU/ML   Procalcitonin    Collection Time: 09/29/22  3:32 AM   Result Value Ref Range    Procalcitonin 0.23 0.00 - 0.49 ng/mL   Basic Metabolic Panel    Collection Time: 09/29/22  7:46 AM   Result Value Ref Range    Sodium 133 (L) 136 - 145 mmol/L    Potassium 4.7 3.5 - 5.1 mmol/L    Chloride 104 101 - 110 mmol/L    CO2 22 21 - 32 mmol/L    Anion Gap 7 4 - 13 mmol/L    Glucose 266 (H) 65 - 100 mg/dL    BUN 9 6 - 23 MG/DL    Creatinine 0.78 (L) 0.8 - 1.5 MG/DL    GFR African American >60 >60 ml/min/1.73m2    GFR Non- >60 >60 ml/min/1.73m2    Calcium 8.1 (L) 8.3 - 10.4 MG/DL   Magnesium    Collection Time: 09/29/22  7:46 AM   Result Value Ref Range    Magnesium 1.7 (L) 1.8 - 2.4 mg/dL   Phosphorus    Collection Time: 09/29/22  7:46 AM   Result Value Ref Range    Phosphorus 2.0 (L) 2.5 - 4.5 MG/DL   POCT Glucose    Collection Time: 09/29/22  8:21 AM   Result Value Ref Range    POC Glucose 244 (H) 65 - 100 mg/dL    Performed by: [facility]        No Known Allergies  Immunization History   Administered Date(s) Administered    COVID-19, MODERNA BLUE border, Primary or Immunocompromised, (age 12y+), IM, 100 mcg/0.5mL 08/06/2021, 09/09/2021       Recent Vital Data:  Patient Vitals for the past 24 hrs:   Temp Pulse Resp BP SpO2   09/29/22 1000 -- (!) 109 18 (!) 152/75 99 %   09/29/22 0900 -- (!) 101 20 (!) 142/86 96 %   09/29/22 0800 -- 84 15 132/85 94 %   09/29/22 0710 98.6 °F (37 °C) 91 17 116/73 96 %   09/29/22 0620 -- 87 15 -- 99 %   09/29/22 0600 -- 91 16 (!) 140/89 92 %   09/29/22 0500 -- 90 23 134/83 93 %   09/29/22 0400 -- 86 15 125/82 93 %   09/29/22 0300 98.7 °F (37.1 °C) 98 23 (!) 145/86 98 %   09/29/22 0200 -- 95 21 120/80 96 %   09/29/22 0100 -- 92 16 124/80 96 %   09/29/22 0000 -- 96 16 120/86 96 %   09/28/22 2300 98.6 °F (37 °C) 93 15 122/85 96 %   09/28/22 2211 -- (!) 103 13 128/89 97 %   09/28/22 2100 -- (!) 102 15 125/75 95 %   09/28/22 2000 98.9 °F (37.2 °C) (!) 103 14 130/86 99 %   09/28/22 1953 -- (!) 106 15 -- 99 %   09/28/22 1900 -- (!) 106 15 122/76 --   09/28/22 1800 -- (!) 121 12 (!) 145/85 --   09/28/22 1700 -- (!) 116 16 135/79 --   09/28/22 1600 -- (!) 120 18 123/72 --   09/28/22 1534 98.3 °F (36.8 °C) (!) 116 15 136/85 --   09/28/22 1500 -- (!) 107 12 136/85 --   09/28/22 1400 -- (!) 124 14 (!) 142/76 --       Oxygen Therapy  SpO2: 99 %  Pulse Oximetry Type: Continuous  Pulse via Oximetry: 104 beats per minute  SPO2 High Alarm Limit: 100  SPO2 Low Alarm Limit POX: 90  Pulse Oximeter Device Mode: Continuous  Pulse Oximeter Device Location: Left, Hand, Finger  O2 Device: None (Room air)  Oximetry Probe Site Changed: No  Skin Assessment: Clean, dry, & intact  Skin Protection for O2 Device: N/A  O2 Flow Rate (L/min): 0 L/min  Oxygen Therapy: None (Room air)  O2 Delivery Method: Nasal cannula    Estimated body mass index is 27.22 kg/m² as calculated from the following:    Height as of this encounter: 5' 10\" (1.778 m). Weight as of this encounter: 189 lb 11.2 oz (86 kg). Intake/Output Summary (Last 24 hours) at 9/29/2022 1112  Last data filed at 9/29/2022 1041  Gross per 24 hour   Intake 5412.53 ml   Output 3550 ml   Net 1862.53 ml       Physical Exam  Vitals and nursing note reviewed. Constitutional:       General: He is not in acute distress. Appearance: He is not diaphoretic. HENT:      Head: Normocephalic and atraumatic. Eyes:      Extraocular Movements: Extraocular movements intact. Cardiovascular:      Rate and Rhythm: Normal rate.    Pulmonary:      Effort: Pulmonary effort is normal. No respiratory distress. Abdominal:      General: There is no distension. Musculoskeletal:         General: No deformity. Skin:     Coloration: Skin is not jaundiced or pale. Neurological:      General: No focal deficit present. Mental Status: He is alert and oriented to person, place, and time. Psychiatric:         Mood and Affect: Mood normal.         Behavior: Behavior normal. Behavior is cooperative.          Signed:  Lorrie Albert MD

## 2022-09-29 NOTE — DIABETES MGMT
Patient admitted with type 1 diabetes, DKA. Admitting blood glucose 354. Patient was started on insulin gtt and received IV Regular insulin 7 units. Blood glucose this morning was 244. Creatinine 0.78. GFR >60. Reviewed patient current regimen: Lantus 10 units nightly and Humalog correctional insulin. Noted patient to discharge. Patient spoke to remotely for assessment regarding diabetes management due to location. Patient verbalized correct patient identifiers and is alert and oriented. Patient would rather proceed with phone conversation than wait to talk with educator in person. Patient has a past medical history of type 1 diabetes. Patient states they have been living with diabetes for since age 23 and voices a positive family history of type 1 diabetes. Patient states they do have a working glucometer with supplies at home. Per patient they typically check blood glucose levels multiple times a day stating he has a freestyle loren. CGM does alarm for hypoglycemia. Patient thinks DKA may have come from stomach flu. Patient states they are currently taking Lantus 30 units daily and Novolog 5-10 units with meals at home for management of diabetes. Patient currently using pens. Patient voices that they have experienced hypoglycemia in the past. Patient states he has baqsimi in case of emergent low glucose. Educated regarding hypoglycemia signs, symptoms, and treatment. Patient has attended formal diabetes education in the past. Patient reports no current difficulty with affording their diabetic supplies discussed insulin affordability programs as patient has commercial insurance. Patient states he currently has all needed diabetes supplies at home. Patient states he just switched to longer needles to try for better insulin absorption. Patient educated regarding importance of site rotation (plans to try using leg administration sites). Patient also educated regarding lipodystrophy.  Described the effects of poor glycemic control and the development of long-term complications such as renal, eye, nerve, and cardiovascular disease. Patient denies numbness and tingling in feet. Discussed target goals for blood glucose and A1C. Encouraged compliance with discharge regimen. Encouraged patient to continue to work on lifestyle modifications and to follow up with endocrinology (Dr. Libra Zepeda) for further titration of regimen. Reviewed sick day rules and use of over-the-counter ketone test strips. Patient verbalized understanding and voices no further questions regarding diabetes management at this time.

## 2022-10-11 NOTE — PROGRESS NOTES
Physician Progress Note      PATIENT:               Kady Howard  CSN #:                  [id number]  :                       1990  ADMIT DATE:       2022 10:52 AM  DISCH DATE:        2022 12:02 PM  RESPONDING  PROVIDER #:        Tiny Evans MD          QUERY TEXT:    Patient admitted with DKA , noted to have a sodium level of 125 . If possible,   please document in progress notes and discharge summary if you are evaluating   and/or treating any of the following: The medical record reflects the following:  Risk Factors: DKA , DM type 1, N/V  Clinical Indicators: NA--125 on admission improving to 135 with iv fluids--   med student notes-- Hyponatremia, Sodium 125 -IV NS and lactated ringers   -Control vomiting to limit GI losses, and encourage consumption of solids and   liquids once controlled, blood glucose 354  Treatment: insulin drip , iv normal saline, labs, antiemetics, essential home   meds. Options provided:  -- hyponatremia  -- hyponatremia not clinically significant  -- Other - I will add my own diagnosis  -- Disagree - Not applicable / Not valid  -- Disagree - Clinically unable to determine / Unknown  -- Refer to Clinical Documentation Reviewer    PROVIDER RESPONSE TEXT:    This patient has hyponatremia .     Query created by: Robby Mckeon on 10/4/2022 12:59 PM      Electronically signed by:  Tiny Evans MD 10/11/2022 7:11 AM